# Patient Record
Sex: MALE | Race: WHITE
[De-identification: names, ages, dates, MRNs, and addresses within clinical notes are randomized per-mention and may not be internally consistent; named-entity substitution may affect disease eponyms.]

---

## 2021-07-15 ENCOUNTER — APPOINTMENT (OUTPATIENT)
Dept: CARDIOLOGY | Facility: CLINIC | Age: 36
End: 2021-07-15

## 2021-07-15 ENCOUNTER — INPATIENT (INPATIENT)
Facility: HOSPITAL | Age: 36
LOS: 0 days | Discharge: HOME | DRG: 103 | End: 2021-07-16
Attending: PSYCHIATRY & NEUROLOGY | Admitting: PSYCHIATRY & NEUROLOGY
Payer: COMMERCIAL

## 2021-07-15 ENCOUNTER — APPOINTMENT (OUTPATIENT)
Dept: NEUROLOGY | Facility: CLINIC | Age: 36
End: 2021-07-15
Payer: COMMERCIAL

## 2021-07-15 VITALS
SYSTOLIC BLOOD PRESSURE: 132 MMHG | HEART RATE: 72 BPM | DIASTOLIC BLOOD PRESSURE: 76 MMHG | RESPIRATION RATE: 20 BRPM | OXYGEN SATURATION: 98 % | HEIGHT: 71 IN | WEIGHT: 195.11 LBS | TEMPERATURE: 99 F

## 2021-07-15 VITALS
DIASTOLIC BLOOD PRESSURE: 75 MMHG | BODY MASS INDEX: 26.6 KG/M2 | SYSTOLIC BLOOD PRESSURE: 116 MMHG | WEIGHT: 190 LBS | HEIGHT: 71 IN | HEART RATE: 67 BPM

## 2021-07-15 DIAGNOSIS — R51.9 HEADACHE, UNSPECIFIED: ICD-10-CM

## 2021-07-15 DIAGNOSIS — R42 DIZZINESS AND GIDDINESS: ICD-10-CM

## 2021-07-15 DIAGNOSIS — G44.89 OTHER HEADACHE SYNDROME: ICD-10-CM

## 2021-07-15 DIAGNOSIS — Z98.89 OTHER SPECIFIED POSTPROCEDURAL STATES: Chronic | ICD-10-CM

## 2021-07-15 DIAGNOSIS — J45.909 UNSPECIFIED ASTHMA, UNCOMPLICATED: ICD-10-CM

## 2021-07-15 LAB
ALBUMIN SERPL ELPH-MCNC: 5 G/DL — SIGNIFICANT CHANGE UP (ref 3.3–5)
ALP SERPL-CCNC: 59 U/L — SIGNIFICANT CHANGE UP (ref 40–120)
ALT FLD-CCNC: 24 U/L — SIGNIFICANT CHANGE UP (ref 10–45)
ANION GAP SERPL CALC-SCNC: 15 MMOL/L — SIGNIFICANT CHANGE UP (ref 5–17)
APPEARANCE CSF: CLEAR — SIGNIFICANT CHANGE UP
APPEARANCE SPUN FLD: COLORLESS — SIGNIFICANT CHANGE UP
APTT BLD: 31 SEC — SIGNIFICANT CHANGE UP (ref 27.5–35.5)
AST SERPL-CCNC: 22 U/L — SIGNIFICANT CHANGE UP (ref 10–40)
BASOPHILS # BLD AUTO: 0.05 K/UL — SIGNIFICANT CHANGE UP (ref 0–0.2)
BASOPHILS NFR BLD AUTO: 0.7 % — SIGNIFICANT CHANGE UP (ref 0–2)
BILIRUB SERPL-MCNC: 0.3 MG/DL — SIGNIFICANT CHANGE UP (ref 0.2–1.2)
BUN SERPL-MCNC: 17 MG/DL — SIGNIFICANT CHANGE UP (ref 7–23)
CALCIUM SERPL-MCNC: 10.2 MG/DL — SIGNIFICANT CHANGE UP (ref 8.4–10.5)
CHLORIDE SERPL-SCNC: 101 MMOL/L — SIGNIFICANT CHANGE UP (ref 96–108)
CO2 SERPL-SCNC: 22 MMOL/L — SIGNIFICANT CHANGE UP (ref 22–31)
COLOR CSF: SIGNIFICANT CHANGE UP
CREAT SERPL-MCNC: 1.06 MG/DL — SIGNIFICANT CHANGE UP (ref 0.5–1.3)
EOSINOPHIL # BLD AUTO: 0.07 K/UL — SIGNIFICANT CHANGE UP (ref 0–0.5)
EOSINOPHIL NFR BLD AUTO: 1 % — SIGNIFICANT CHANGE UP (ref 0–6)
GLUCOSE CSF-MCNC: 59 MG/DL — SIGNIFICANT CHANGE UP (ref 40–70)
GLUCOSE SERPL-MCNC: 96 MG/DL — SIGNIFICANT CHANGE UP (ref 70–99)
HCT VFR BLD CALC: 41.3 % — SIGNIFICANT CHANGE UP (ref 39–50)
HGB BLD-MCNC: 14 G/DL — SIGNIFICANT CHANGE UP (ref 13–17)
IMM GRANULOCYTES NFR BLD AUTO: 0.3 % — SIGNIFICANT CHANGE UP (ref 0–1.5)
INR BLD: 0.94 RATIO — SIGNIFICANT CHANGE UP (ref 0.88–1.16)
LYMPHOCYTES # BLD AUTO: 2.22 K/UL — SIGNIFICANT CHANGE UP (ref 1–3.3)
LYMPHOCYTES # BLD AUTO: 32.7 % — SIGNIFICANT CHANGE UP (ref 13–44)
MCHC RBC-ENTMCNC: 30.8 PG — SIGNIFICANT CHANGE UP (ref 27–34)
MCHC RBC-ENTMCNC: 33.9 GM/DL — SIGNIFICANT CHANGE UP (ref 32–36)
MCV RBC AUTO: 91 FL — SIGNIFICANT CHANGE UP (ref 80–100)
MONOCYTES # BLD AUTO: 0.73 K/UL — SIGNIFICANT CHANGE UP (ref 0–0.9)
MONOCYTES NFR BLD AUTO: 10.8 % — SIGNIFICANT CHANGE UP (ref 2–14)
NEUTROPHILS # BLD AUTO: 3.69 K/UL — SIGNIFICANT CHANGE UP (ref 1.8–7.4)
NEUTROPHILS NFR BLD AUTO: 54.5 % — SIGNIFICANT CHANGE UP (ref 43–77)
NRBC # BLD: 0 /100 WBCS — SIGNIFICANT CHANGE UP (ref 0–0)
PLATELET # BLD AUTO: 237 K/UL — SIGNIFICANT CHANGE UP (ref 150–400)
POTASSIUM SERPL-MCNC: 3.9 MMOL/L — SIGNIFICANT CHANGE UP (ref 3.5–5.3)
POTASSIUM SERPL-SCNC: 3.9 MMOL/L — SIGNIFICANT CHANGE UP (ref 3.5–5.3)
PROT CSF-MCNC: 25 MG/DL — SIGNIFICANT CHANGE UP (ref 15–45)
PROT SERPL-MCNC: 7.8 G/DL — SIGNIFICANT CHANGE UP (ref 6–8.3)
PROTHROM AB SERPL-ACNC: 11.3 SEC — SIGNIFICANT CHANGE UP (ref 10.6–13.6)
RBC # BLD: 4.54 M/UL — SIGNIFICANT CHANGE UP (ref 4.2–5.8)
RBC # FLD: 12.9 % — SIGNIFICANT CHANGE UP (ref 10.3–14.5)
SODIUM SERPL-SCNC: 138 MMOL/L — SIGNIFICANT CHANGE UP (ref 135–145)
TROPONIN T, HIGH SENSITIVITY RESULT: <6 NG/L — SIGNIFICANT CHANGE UP (ref 0–51)
TUBE TYPE: SIGNIFICANT CHANGE UP
WBC # BLD: 6.78 K/UL — SIGNIFICANT CHANGE UP (ref 3.8–10.5)
WBC # FLD AUTO: 6.78 K/UL — SIGNIFICANT CHANGE UP (ref 3.8–10.5)

## 2021-07-15 PROCEDURE — 99285 EMERGENCY DEPT VISIT HI MDM: CPT

## 2021-07-15 PROCEDURE — 70496 CT ANGIOGRAPHY HEAD: CPT | Mod: 26,MG

## 2021-07-15 PROCEDURE — 99072 ADDL SUPL MATRL&STAF TM PHE: CPT

## 2021-07-15 PROCEDURE — 70498 CT ANGIOGRAPHY NECK: CPT | Mod: 26,MG

## 2021-07-15 PROCEDURE — 99221 1ST HOSP IP/OBS SF/LOW 40: CPT | Mod: GC

## 2021-07-15 PROCEDURE — G1004: CPT

## 2021-07-15 PROCEDURE — 93010 ELECTROCARDIOGRAM REPORT: CPT

## 2021-07-15 PROCEDURE — 99204 OFFICE O/P NEW MOD 45 MIN: CPT

## 2021-07-15 RX ORDER — ENOXAPARIN SODIUM 100 MG/ML
40 INJECTION SUBCUTANEOUS DAILY
Refills: 0 | Status: DISCONTINUED | OUTPATIENT
Start: 2021-07-15 | End: 2021-07-16

## 2021-07-15 RX ORDER — IBUPROFEN 200 MG
600 TABLET ORAL ONCE
Refills: 0 | Status: COMPLETED | OUTPATIENT
Start: 2021-07-15 | End: 2021-07-15

## 2021-07-15 RX ADMIN — Medication 600 MILLIGRAM(S): at 21:52

## 2021-07-15 RX ADMIN — Medication 600 MILLIGRAM(S): at 20:40

## 2021-07-15 NOTE — H&P ADULT - NSHPSOURCEINFORD_GEN_ALL_CORE
Chart(s)
Seen and examined,  was drinking and friend was concerned about alcohol intake. Chart reports that friend was concerned about self-harm statements.  Pt. denies this and denies SI/HI at time of exam. +intox at time of exam,  was drinking at 5p and also took Ambien PTA. No recent illness, no fever/chills, no sick contacts. MMM, clear lungs, heart reg, abd soft, NT to palp, no CVAT, no edema, NT calves.

## 2021-07-15 NOTE — ED PROVIDER NOTE - MUSCULOSKELETAL NEGATIVE STATEMENT, MLM
+BACK PAIN (per baseline), no other musculoskeletal pain, no neck pain, +R LEG WEAKNESS (PER BASELINE), no other weakness

## 2021-07-15 NOTE — H&P ADULT - ASSESSMENT
36M w/ PMH of Asthma, R. sided lumbar radiculopathy, L. knee disease, presents w/ headache and LOC event. Exam non focal. CTH/CTA negative.     Impression: Thunderclap headache, followed by bilateral tinnitus, and LOC event. r/o sentinel bleed. r/o seizures.     Plan:   [] LP in ED to rule out sentinel bleed  [] MR brain w/w/o contrast; MRV w/ contrast;   [] VEEG for 12 hours; Can discontinue and discontinue if negative  [] Anticipate early discharge     Case discussed w/ Dr. Rader

## 2021-07-15 NOTE — H&P ADULT - HISTORY OF PRESENT ILLNESS
36M w/ PMH of Asthma, R. sided lumbar radiculopathy, L. knee disease, presents w/ headache and LOC event.      Patient was at the bar on Tuesday July 13. Had 4 beers then started to feel "off". When he left the bar he developed at 12/10 headache that reached maximal onset within seconds. Directly after, he developed bilateral "ringing in his ears" and then had a LOC event for 10 seconds. When he was awoken he was having difficulty articulating himself for half an hour. He went to the ED at [facility] where he was told he had a prolonged QT interval, given mag and K. Discharged. Saw Dr. Kearney in clinic who sent him to the ED for further workup. No history of LOC episodes or seizures. No FH of seizures, aneurysms, strokes.     CTH negative  CTA h/n negative

## 2021-07-15 NOTE — ED PROVIDER NOTE - OBJECTIVE STATEMENT
36y M presenting with continued headache after an episode of syncope and headache 2 days ago, came to ED per referral from neurologist. 2 days ago patient was out drinking when he suddenly started to feel "off" and left the bar. He had 4 drinks over the course of 3.5 hours, normal for patient and denied feeling drunk at time. Upon leaving, patient had nausea, "worst headache of his life" ("12/10 pain"), ringing in both ears, and lost vision in both eyes. He tried to speak to tell his fiance to call 911 but speech came out garbled. Passed out but denies head trauma. LOC for 10 seconds. No shaking, incontinence or tongue biting but bit the inside of his lip. Remembers waking up on the sidewalk. He was then taken to the hospital (unsure if [facility] or [facility]) found prolonged QT, repleted Mg+ and K+ and sent him home. For the past two days he has had generalized headache (6/10), lethargy and lightheadedness. Has not taken any meds for pain. Went to neurologist Dr. Kearney today who sent him to ED for head imaging. Denies neck pain, visual changes, photophobia, focal neurologic deficits, paresthesias, numbness, or vomiting.

## 2021-07-15 NOTE — H&P ADULT - NSHPPHYSICALEXAM_GEN_ALL_CORE
Exam:   MS: Oriented x3. Fluent. Follows crossed commands.   CN: VFF. EOMI. V1-V3 intact. Face symmetric. T/u midline.   Motor: Full strength throughout.   Sensory: Intact to LT throughout   Reflexes: 2+ throughout. Babinski absent bilaterally.   Coordination: No dysmetria on FNF or ataxia on HTS bilaterally   Gait: Deferred

## 2021-07-15 NOTE — ASSESSMENT
[FreeTextEntry1] : Mr. Grijalva is a 36-year-old who suffers from asthma, right-sided lumbar radiculopathy and left knee meniscal disease.  After imbibing 5 alcoholic drinks 2 nights ago, he developed lightheadedness, nausea, severe headache, ear ringing, loss of balance, difficulty articulating and brief loss of consciousness.  He gradually improved but continues to experience headache and mental slowness.\par \par The cause of his presentation is unclear.  Alcohol intoxication may well have contributed to his presentation.  His symptoms seem very vasovagal in etiology.  He however developed severe global headache which has improved but not completely resolved.  He also briefly lost consciousness.  A sentinel hemorrhage or migraine are considerations.\par \par I suggested that he proceed to the emergency room for neurologic and cardiac assessments.  Further management will depend upon those results and his clinical course.

## 2021-07-15 NOTE — ED PROVIDER NOTE - NEURO NEGATIVE STATEMENT, MLM
no loss of consciousness, no gait abnormality, +HEADACHE, no sensory deficits, +R LEG WEAKNESS (PER BASELINE), no other weakness

## 2021-07-15 NOTE — ED ADULT NURSE NOTE - OBJECTIVE STATEMENT
37yo M, no known PMH. states  he had a syncopal episode 2 days ago, was out with friends, had a few drinks ( states this is not unusual for him ), began to feel ill, light headed, vision in and out, then states he passed out, fiance was there to help him up, unclear if pt hit his head directly. states since then he has not felt himself, still light headed and dizzy. was seen at another ED the day of incident and states he did not receive head CT. is aaox4, neuro exam grossly intact, PERRL. denies drug use, new supplements or change in activity. appears well. EKG and FS complete.

## 2021-07-15 NOTE — PHYSICAL EXAM
[FreeTextEntry1] : Constitutional:  Patient was well-developed, well-nourished and in no acute distress. \par \par Head:  Normocephalic, atraumatic. Tympanic membranes were clear. \par \par Neck:  Supple with full range of motion. \par \par Cardiovascular:  Cardiac rhythm was regular without murmur. There were no carotid bruits. Peripheral pulses were full and symmetric.  Pulse was 72 blood pressure was 140/90 in the right arm and 140/80 in the left.  \par \par Respiratory:  Lungs were clear. \par \par Abdomen:  Soft and nontender. \par \par Spine:  Nontender. \par \par Skin:  There were no rashes. \par \par NEUROLOGICAL EXAMINATION:\par \par Mental Status: Patient was alert and oriented. Speech was fluent. There was no dysarthria. \par \par Cranial Nerves: \par \par II: Visual acuity was 20/ 20 bilaterally with near card. Pupils were equal and reactive. Visual fields were full. Funduscopic examination was normal. \par \par III, IV, VI:  Eye movements were full without nystagmus. \par \par V: Facial sensation was intact. \par \par VII: Facial strength was normal. \par \par VIII: Hearing was equal. \par \par IX, X: Palatal movement was normal. Phonation was normal. \par \par XI: Sternocleidomastoids and trapezii were normal. \par \par XII: Tongue was midline and movements normal. There was no lingual atrophy or fasciculations. \par \par Motor Examination: Muscle bulk, tone and strength were normal. \par \par Sensory Examination: Pinprick, vibration and joint position sense were intact. \par \par Reflexes: DTRs were 1 at the biceps, 2 at the knees and ankles and absent elsewhere.\par \par Plantar Responses: Plantar responses were flexor. \par \par Coordination/Cerebellar Function: There was no dysmetria on finger to nose or heel to shin testing. \par \par Gait/Stance: Gait and tandem were normal. Romberg was negative.\par

## 2021-07-15 NOTE — CONSULT NOTE ADULT - SUBJECTIVE AND OBJECTIVE BOX
TRUONG MICHAUD  Male  MRN-[id number]    HPI:    36M w/ PMH of Asthma, R. sided lumbar radiculopathy, L. knee disease, presents w/ headache and LOC evidence.     Patient was at the bar on Tuesday July 13. Had 4 beers then started to feel "off". When he left the bar he developed at 12/10 headache that reached maximal onset within seconds. Directly after, he developed bilateral "ringing in his ears" and then had a LOC event for 10 seconds. When he was awoken he was having difficulty articulating himself for half an hour. He went to the ED at [facility] where he was told he had a prolonged QT interval, given mag and K. Discharged. Saw Dr. Kearney in clinic who sent him to the ED for further workup.     NIHSS:  MRS:     RENETTA: All negative except as mentioned in HPI    PAST MEDICAL & SURGICAL HISTORY:  Asthma    Seasonal allergies    History of arthroscopy of left knee  x 2 (2006 &amp; 2007)      MEDICATIONS  (STANDING):    MEDICATIONS  (PRN):    Allergies    eggs (Anaphylaxis)  No Known Drug Allergies  Tree Nuts (Other)    Intolerances        VITAL SIGNS:  T(F): 98.2  HR: 64  BP: 120/85  RR: 17  SpO2: 99%    Exam:   MS: Oriented x3. Fluent. Follows crossed commands.   CN: VFF. EOMI. V1-V3 intact. Face symmetric. T/u midline.   Motor: Full strength throughout.   Sensory: Intact to LT throughout   Reflexes: 2+ throughout. Babinski absent bilaterally.   Coordination: No dysmetria on FNF or ataxia on HTS bilaterally   Gait: Deferred    LABS:                          14.0   6.78  )-----------( 237      ( 15 Jul 2021 19:19 )             41.3     07-15    138  |  101  |  17  ----------------------------<  96  3.9   |  22  |  1.06    Ca    10.2      15 Jul 2021 19:19    TPro  7.8  /  Alb  5.0  /  TBili  0.3  /  DBili  x   /  AST  22  /  ALT  24  /  AlkPhos  59  07-15    PT/INR - ( 15 Jul 2021 19:19 )   PT: 11.3 sec;   INR: 0.94 ratio         PTT - ( 15 Jul 2021 19:19 )  PTT:31.0 sec    RADIOLOGY & ADDITIONAL STUDIES:             TRUONG MICHAUD  Male  MRN-[id number]    HPI:    36M w/ PMH of Asthma, R. sided lumbar radiculopathy, L. knee disease, presents w/ headache and LOC event.      Patient was at the bar on Tuesday July 13. Had 4 beers then started to feel "off". When he left the bar he developed at 12/10 headache that reached maximal onset within seconds. Directly after, he developed bilateral "ringing in his ears" and then had a LOC event for 10 seconds. When he was awoken he was having difficulty articulating himself for half an hour. He went to the ED at [facility] where he was told he had a prolonged QT interval, given mag and K. Discharged. Saw Dr. Kearney in clinic who sent him to the ED for further workup. No history of LOC episodes or seizures. No FH of seizures, aneurysms, strokes.     CTH negative  CTA h/n negative    NIHSS: 0  MRS: 0    ROS: All negative except as mentioned in HPI    PAST MEDICAL & SURGICAL HISTORY:  Asthma    Seasonal allergies    History of arthroscopy of left knee  x 2 (2006 &amp; 2007)      MEDICATIONS  (STANDING):    MEDICATIONS  (PRN):    Allergies    eggs (Anaphylaxis)  No Known Drug Allergies  Tree Nuts (Other)    Intolerances        VITAL SIGNS:  T(F): 98.2  HR: 64  BP: 120/85  RR: 17  SpO2: 99%    Exam:   MS: Oriented x3. Fluent. Follows crossed commands.   CN: VFF. EOMI. V1-V3 intact. Face symmetric. T/u midline.   Motor: Full strength throughout.   Sensory: Intact to LT throughout   Reflexes: 2+ throughout. Babinski absent bilaterally.   Coordination: No dysmetria on FNF or ataxia on HTS bilaterally   Gait: Deferred    LABS:                          14.0   6.78  )-----------( 237      ( 15 Jul 2021 19:19 )             41.3     07-15    138  |  101  |  17  ----------------------------<  96  3.9   |  22  |  1.06    Ca    10.2      15 Jul 2021 19:19    TPro  7.8  /  Alb  5.0  /  TBili  0.3  /  DBili  x   /  AST  22  /  ALT  24  /  AlkPhos  59  07-15    PT/INR - ( 15 Jul 2021 19:19 )   PT: 11.3 sec;   INR: 0.94 ratio         PTT - ( 15 Jul 2021 19:19 )  PTT:31.0 sec    RADIOLOGY & ADDITIONAL STUDIES:

## 2021-07-15 NOTE — ED PROVIDER NOTE - CLINICAL SUMMARY MEDICAL DECISION MAKING FREE TEXT BOX
ap- 36 M w/ hx of asthma, R sided lumbar radiculopathy, and L knee disease, presents to the ER w/ complaint of mental fogginess. Pt states that he left a bar on Tuesday on July 13, he developed a severe headache w/ associated nausea and syncope (unconcious for 10 seconds witnessed by his fiance) + slurred speech. Pt went to [facility] and was evaluated and was found to have prolonged qtc, and had mag and K repletion. Pt states that he has been having an improving headache, a 6/10 but reports lethargy, no recurrent episodes of syncope. Since the episode, pt has been tolerating po. Pt w/ no numbness no weakness (from his baseline), no vision changes. On exam, pt is awake and alert in no distress. Neuro: 5/5 bilateral elbow flexion/extension, 5/5 bilateral wrist flexion/extension, 5/5 bilateral hand , 5/5 bilateral hip flexion/extension, 5/5 bilateral knee flexion/extension, 5/5 bilateral dorsiflexion/plantarflexion, intact sensation in the bilateral arms and legs to light touch, normal finger to nose bilaterally w/ no dysmetria, EOMI, CN2-12 intact, pupils are 4 mm and reactive bilaterally  Plan for labs, ekg and ct of the head, pt was seen by Dr. Kearney today and recommended to come to the ER for further neurologic and cardiac eval ap- 36 M w/ hx of asthma, R sided lumbar radiculopathy, and L knee disease, presents to the ER w/ complaint of mental fogginess. Pt states that he left a bar on Tuesday on July 13, he developed a severe headache w/ associated nausea and syncope (unconscious for 10 seconds witnessed by his fiance) + slurred speech. Pt went to [facility] and was evaluated and was found to have prolonged qtc, and had mag and K repletion. Pt states that he has been having an improving headache, a 6/10 but reports lethargy, no recurrent episodes of syncope. Since the episode, pt has been tolerating po. Pt w/ no numbness no weakness (from his baseline), no vision changes. On exam, pt is awake and alert in no distress. Neuro: 5/5 bilateral elbow flexion/extension, 5/5 bilateral wrist flexion/extension, 5/5 bilateral hand , 5/5 bilateral hip flexion/extension, 5/5 bilateral knee flexion/extension, 5/5 bilateral dorsiflexion/plantarflexion, intact sensation in the bilateral arms and legs to light touch, normal finger to nose bilaterally w/ no dysmetria, EOMI, CN2-12 intact, pupils are 4 mm and reactive bilaterally  Plan for labs, ekg and ct of the head, pt was seen by Dr. Kearney today and recommended to come to the ER for further neurologic and cardiac eval

## 2021-07-15 NOTE — ED ADULT NURSE REASSESSMENT NOTE - NS ED NURSE REASSESS COMMENT FT1
Received report from MIGDALIA Henderson. Pt alert and oriented, denies chest pain, abdominal pain, nausea, vomiting, diarrhea. Chest rise equal and symmetrical, lung sounds clear. Pt c/o headache, Dr. Manrique made aware. Will continue to monitor.

## 2021-07-15 NOTE — ED PROVIDER NOTE - PROGRESS NOTE DETAILS
neuro recommendiong admission, pt w/ questions, neurology paged to assist w/ pt questions LP obtained neuro recommending admission, pt w/ questions, neurology paged to assist w/ pt questions LP obtained

## 2021-07-15 NOTE — CONSULT LETTER
[Dear  ___] : Dear  [unfilled], [Consult Letter:] : I had the pleasure of evaluating your patient, [unfilled]. [Please see my note below.] : Please see my note below. [Consult Closing:] : Thank you very much for allowing me to participate in the care of this patient.  If you have any questions, please do not hesitate to contact me. [Sincerely,] : Sincerely, [FreeTextEntry3] : Andry Kearney MD\par

## 2021-07-15 NOTE — REVIEW OF SYSTEMS
[Numbness] : numbness [Tingling] : tingling [Dizziness] : dizziness [Tension Headache] : tension-type headaches [Negative] : Heme/Lymph

## 2021-07-15 NOTE — H&P ADULT - ATTENDING COMMENTS
patient seen and examined at bedside. c/o lbp, no headache  exam non focal  CT head CTA normal  CSF unremarkable  MRI brain with kamari, MRV unremarkable  EEG results pending  OK to D/C home after EEG  outpt f/u with Dr Kearney

## 2021-07-15 NOTE — ED ADULT NURSE NOTE - NSIMPLEMENTINTERV_GEN_ALL_ED
Implemented All Universal Safety Interventions:  Effie to call system. Call bell, personal items and telephone within reach. Instruct patient to call for assistance. Room bathroom lighting operational. Non-slip footwear when patient is off stretcher. Physically safe environment: no spills, clutter or unnecessary equipment. Stretcher in lowest position, wheels locked, appropriate side rails in place.

## 2021-07-15 NOTE — HISTORY OF PRESENT ILLNESS
[FreeTextEntry1] : I had the pleasure of seeing Mr. Denzel Grijalva in the office today.  He is a 36 year right-handed patient who was referred for neurologic evaluation at your kind suggestion.\par \par Mr. 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  He has undergone 2 left knee meniscal repairs.\par \par On Tuesday evening July 13th, he drank 2 beers and had 3 mixed drinks with his fiancé and friends over 3 and half hours.  While standing at the bar, he suddenly felt bizarre.  He described feeling dizzy, lightheaded and nauseated.  He felt no better when he sat down.  He told his fiancée that he had to go home.  As they walked across the street, he developed a severe global headache, ear ringing and visual loss.  He was unable to speak or walk.  He got down on his knees and lost consciousness.  He was held by his fiancée and did not strike his head.  He believes that he regained consciousness within several seconds.  He sustained a bite to his right inner cheek.  He however was acutely short of breath and was wheezing.  He felt confused and had difficulty articulating.\par \par He was transported to the [facility] in [facility].  He underwent blood tests and an EKG.  No neuro imaging was performed.  He was told that his electrolytes were abnormal and he was administered potassium and magnesium.  He was discharged on Wednesday morning July 14 at 2 AM.\par \par He continues to experience mild lightheadedness and frontal headache.  He has chronic low back pain and right leg numbness.  He denies cognitive, visual, hearing, swallowing, motor, gait or sphincteric difficulties.  There is no history of migraines or headaches, seizures or strokes.

## 2021-07-15 NOTE — ED ADULT TRIAGE NOTE - CHIEF COMPLAINT QUOTE
PT was out on 7/13, felt like he was having a global headache, sycopized and had garbled speech.  PT presents today for evaluation of dizziness and headache x 2 days since syncopal episode.

## 2021-07-16 ENCOUNTER — TRANSCRIPTION ENCOUNTER (OUTPATIENT)
Age: 36
End: 2021-07-16

## 2021-07-16 VITALS
SYSTOLIC BLOOD PRESSURE: 121 MMHG | TEMPERATURE: 98 F | RESPIRATION RATE: 18 BRPM | DIASTOLIC BLOOD PRESSURE: 72 MMHG | OXYGEN SATURATION: 96 % | HEART RATE: 80 BPM

## 2021-07-16 LAB
GRAM STN FLD: SIGNIFICANT CHANGE UP
GRAM STN FLD: SIGNIFICANT CHANGE UP
NEUTROPHILS # CSF: SIGNIFICANT CHANGE UP (ref 0–6)
NRBC NFR CSF: <1 — SIGNIFICANT CHANGE UP (ref 0–5)
RBC # CSF: 3 /UL — HIGH (ref 0–0)
SARS-COV-2 RNA SPEC QL NAA+PROBE: SIGNIFICANT CHANGE UP
SPECIMEN SOURCE: SIGNIFICANT CHANGE UP

## 2021-07-16 PROCEDURE — 82962 GLUCOSE BLOOD TEST: CPT

## 2021-07-16 PROCEDURE — 84484 ASSAY OF TROPONIN QUANT: CPT

## 2021-07-16 PROCEDURE — 70498 CT ANGIOGRAPHY NECK: CPT

## 2021-07-16 PROCEDURE — 70553 MRI BRAIN STEM W/O & W/DYE: CPT | Mod: 26

## 2021-07-16 PROCEDURE — U0005: CPT

## 2021-07-16 PROCEDURE — U0003: CPT

## 2021-07-16 PROCEDURE — 87070 CULTURE OTHR SPECIMN AEROBIC: CPT

## 2021-07-16 PROCEDURE — 99239 HOSP IP/OBS DSCHRG MGMT >30: CPT

## 2021-07-16 PROCEDURE — A9585: CPT

## 2021-07-16 PROCEDURE — 95816 EEG AWAKE AND DROWSY: CPT | Mod: 26

## 2021-07-16 PROCEDURE — 70553 MRI BRAIN STEM W/O & W/DYE: CPT

## 2021-07-16 PROCEDURE — 87205 SMEAR GRAM STAIN: CPT

## 2021-07-16 PROCEDURE — 89051 BODY FLUID CELL COUNT: CPT

## 2021-07-16 PROCEDURE — 70545 MR ANGIOGRAPHY HEAD W/DYE: CPT | Mod: 26,59

## 2021-07-16 PROCEDURE — 80053 COMPREHEN METABOLIC PANEL: CPT

## 2021-07-16 PROCEDURE — 70450 CT HEAD/BRAIN W/O DYE: CPT

## 2021-07-16 PROCEDURE — 84157 ASSAY OF PROTEIN OTHER: CPT

## 2021-07-16 PROCEDURE — 95816 EEG AWAKE AND DROWSY: CPT

## 2021-07-16 PROCEDURE — 99285 EMERGENCY DEPT VISIT HI MDM: CPT | Mod: 25

## 2021-07-16 PROCEDURE — 85610 PROTHROMBIN TIME: CPT

## 2021-07-16 PROCEDURE — 82945 GLUCOSE OTHER FLUID: CPT

## 2021-07-16 PROCEDURE — 70545 MR ANGIOGRAPHY HEAD W/DYE: CPT

## 2021-07-16 PROCEDURE — 85730 THROMBOPLASTIN TIME PARTIAL: CPT

## 2021-07-16 PROCEDURE — 70496 CT ANGIOGRAPHY HEAD: CPT

## 2021-07-16 PROCEDURE — 62270 DX LMBR SPI PNXR: CPT

## 2021-07-16 PROCEDURE — 85025 COMPLETE CBC W/AUTO DIFF WBC: CPT

## 2021-07-16 RX ORDER — ACETAMINOPHEN 500 MG
650 TABLET ORAL EVERY 6 HOURS
Refills: 0 | Status: DISCONTINUED | OUTPATIENT
Start: 2021-07-16 | End: 2021-07-16

## 2021-07-16 RX ORDER — LANOLIN ALCOHOL/MO/W.PET/CERES
5 CREAM (GRAM) TOPICAL AT BEDTIME
Refills: 0 | Status: DISCONTINUED | OUTPATIENT
Start: 2021-07-16 | End: 2021-07-16

## 2021-07-16 RX ORDER — TRAMADOL HYDROCHLORIDE 50 MG/1
1 TABLET ORAL
Qty: 6 | Refills: 0
Start: 2021-07-16 | End: 2021-07-18

## 2021-07-16 RX ORDER — CYCLOBENZAPRINE HYDROCHLORIDE 10 MG/1
5 TABLET, FILM COATED ORAL ONCE
Refills: 0 | Status: COMPLETED | OUTPATIENT
Start: 2021-07-16 | End: 2021-07-16

## 2021-07-16 RX ORDER — ACETAMINOPHEN 500 MG
1000 TABLET ORAL ONCE
Refills: 0 | Status: COMPLETED | OUTPATIENT
Start: 2021-07-16 | End: 2021-07-16

## 2021-07-16 RX ADMIN — CYCLOBENZAPRINE HYDROCHLORIDE 5 MILLIGRAM(S): 10 TABLET, FILM COATED ORAL at 01:00

## 2021-07-16 RX ADMIN — Medication 5 MILLIGRAM(S): at 01:01

## 2021-07-16 RX ADMIN — Medication 1000 MILLIGRAM(S): at 08:30

## 2021-07-16 RX ADMIN — Medication 1000 MILLIGRAM(S): at 07:52

## 2021-07-16 NOTE — EEG REPORT - NS EEG TEXT BOX
REPORT OF ROUTINE EEG WITH VIDEO AND REPORT OF DIGITAL COMPRESSED SPECTRAL ARRAY ANALYSIS   [facility]: [address] [place] [address], Phone: [phone] [facility]: [phone], [place] [address], Phone: [phone] Office: [address] [facility], [address], [place] [address], Phone: [phone]  Patient Name: Denzel Grijalva    Age: 36 year : 1985 MRN #: [id number], Location: - Referring Physician: -  EEG #: [id number] Study Date: 2021   Start Time: 10:20:53 AM    Study Duration: 21.6 		  Technical Information:					 On Instrument: - Placement and Labeling of Electrodes: The EEG was performed utilizing 20 channels referential EEG connections (coronal over temporal over parasagittal montage) using all standard 10-20 electrode placements with EKG.  Recording was at a sampling rate of 256 samples per second per channel.  Time synchronized digital video recording was done simultaneously with EEG recording.  A low light infrared camera was used for low light recording.  Jagdeep and seizure detection algorithms were utilized.  History:  Routine EEG performed at bedside.  COR: Patient is Alert, Cooperastive , awake. Questions and answers correct x3  No Hv due to covid-19 protocol  No photiv due to equipment issues  Routine EEG for 36 year old Male patient with history of PMH of asthma, Right sided Lumbar Radiculopathy, L. Knee disease péw Headache and LOC event.   Medication No Data.  Study Interpretation:  FINDINGS:  The background was continuous, spontaneously variable and reactive.  During wakefulness, the posteriorly dominant rhythm consisted of symmetric, well modulated 9.5 Hz activity, with an amplitude to 30 uV, that attenuated to eye opening.  Low amplitude central beta was noted in wakefulness.  Background Slowing: Generalized slowing: none was present. Focal slowing: none was present.  Sleep Background: Stage II sleep transients were not recorded.  Epileptiform Activity:  No epileptiform discharges were present.  Events: No clinical events were recorded. No seizures were recorded.  Activation Procedures:  -Hyperventilation was not performed.   -Photic stimulation was not performed.  Artifacts: Intermittent myogenic and movement artifacts were noted.  ECG: The heart rate on single channel ECG was predominantly between 65-75 BPM.  EEG Classification / Summary: Normal Routine EEG Study in the awake state   Clinical Impression: Normal study. There were no epileptiform abnormalities recorded.    Preliminary Fellow Report, final report pending attending review  Elvis Arellano MD Epilepsy Fellow  Reading Room: [phone] On Call Service After Hours: [phone]    REPORT OF ROUTINE EEG WITH VIDEO AND REPORT OF DIGITAL COMPRESSED SPECTRAL ARRAY ANALYSIS   [facility]: [address], [address] [place] [address], Phone: [phone] [facility]: [phone], [place] [address], Phone: [phone] Office: [address] [facility], [address], [place] [address], Phone: [phone]  Patient Name: Denzel Grijalva    Age: 36 year : 1985 MRN #: [id number], Location: - Referring Physician: -  EEG #: [id number] Study Date: 2021   Start Time: 10:20:53 AM    Study Duration: 21.6 		  Technical Information:					 On Instrument: - Placement and Labeling of Electrodes: The EEG was performed utilizing 20 channels referential EEG connections (coronal over temporal over parasagittal montage) using all standard 10-20 electrode placements with EKG.  Recording was at a sampling rate of 256 samples per second per channel.  Time synchronized digital video recording was done simultaneously with EEG recording.  A low light infrared camera was used for low light recording.  Jagdeep and seizure detection algorithms were utilized.  History:  Routine EEG performed at bedside.  COR: Patient is Alert, Cooperastive , awake. Questions and answers correct x3  No Hv due to covid-19 protocol  No photiv due to equipment issues  Routine EEG for 36 year old Male patient with history of PMH of asthma, Right sided Lumbar Radiculopathy, L. Knee disease péw Headache and LOC event.   Medication No Data.  Study Interpretation:  FINDINGS:  The background was continuous, spontaneously variable and reactive.  During wakefulness, the posteriorly dominant rhythm consisted of symmetric, well modulated 9.5 Hz activity, with an amplitude to 30 uV, that attenuated to eye opening.  Low amplitude central beta was noted in wakefulness.  Background Slowing: Generalized slowing: none was present. Focal slowing: none was present.  Sleep Background: Stage II sleep transients were not recorded.  Epileptiform Activity:  No epileptiform discharges were present.  Events: No clinical events were recorded. No seizures were recorded.  Activation Procedures:  -Hyperventilation was not performed.   -Photic stimulation was not performed.  Artifacts: Intermittent myogenic and movement artifacts were noted.  ECG: The heart rate on single channel ECG was predominantly between 65-75 BPM.  EEG Classification / Summary: Normal Routine EEG Study in the awake state   Clinical Impression: Normal study. There were no epileptiform abnormalities recorded.    Elvis Arellano MD Epilepsy Fellow  Reading Room: [phone] On Call Service After Hours: [phone]
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
## 2021-07-16 NOTE — DISCHARGE NOTE PROVIDER - CARE PROVIDER_API CALL
Andry Kearney)  Electrodiagnostic Medicine; Internal Medicine; Neurology  [address] [facility] [address]  [place], [place] [id number]  Phone: [phone]  Fax: [phone]  Established Patient  Follow Up Time: 2 weeks

## 2021-07-16 NOTE — DISCHARGE NOTE NURSING/CASE MANAGEMENT/SOCIAL WORK - PATIENT PORTAL LINK FT
You can access the FollowMyHealth Patient Portal offered by [facility] by registering at the following website: [email or web address]. By joining [employer]’s FollowMyHealth portal, you will also be able to view your health information using other applications (apps) compatible with our system.

## 2021-07-16 NOTE — DISCHARGE NOTE PROVIDER - NSDCMRMEDTOKEN_GEN_ALL_CORE_FT
Claritin 10 mg oral tablet: 1 tab(s) orally once a day  Rectiv 0.4% rectal ointment: 1 application rectal every 12 hours   Claritin 10 mg oral tablet: 1 tab(s) orally once a day  Rectiv 0.4% rectal ointment: 1 application rectal every 12 hours  traMADol 50 mg oral tablet: 1 tab(s) orally 2 times a day, As Needed -for moderate pain MDD:100mg

## 2021-07-16 NOTE — DISCHARGE NOTE PROVIDER - NSDCCPCAREPLAN_GEN_ALL_CORE_FT
PRINCIPAL DISCHARGE DIAGNOSIS  Diagnosis: Other headache syndrome  Assessment and Plan of Treatment: You had a MRI which did not show a bleed and was unremarkable. Please follow-up with your neurologist for further management and care.       Good

## 2021-07-19 LAB
CULTURE RESULTS: SIGNIFICANT CHANGE UP
SPECIMEN SOURCE: SIGNIFICANT CHANGE UP

## 2021-08-03 ENCOUNTER — NON-APPOINTMENT (OUTPATIENT)
Age: 36
End: 2021-08-03

## 2021-08-03 ENCOUNTER — APPOINTMENT (OUTPATIENT)
Dept: CARDIOLOGY | Facility: CLINIC | Age: 36
End: 2021-08-03
Payer: COMMERCIAL

## 2021-08-03 VITALS — DIASTOLIC BLOOD PRESSURE: 76 MMHG | SYSTOLIC BLOOD PRESSURE: 123 MMHG

## 2021-08-03 VITALS
TEMPERATURE: 98.6 F | WEIGHT: 185 LBS | OXYGEN SATURATION: 100 % | HEIGHT: 71 IN | BODY MASS INDEX: 25.9 KG/M2 | HEART RATE: 71 BPM

## 2021-08-03 VITALS — DIASTOLIC BLOOD PRESSURE: 70 MMHG | SYSTOLIC BLOOD PRESSURE: 120 MMHG

## 2021-08-03 DIAGNOSIS — Z87.39 PERSONAL HISTORY OF OTHER DISEASES OF THE MUSCULOSKELETAL SYSTEM AND CONNECTIVE TISSUE: ICD-10-CM

## 2021-08-03 DIAGNOSIS — Z56.0 UNEMPLOYMENT, UNSPECIFIED: ICD-10-CM

## 2021-08-03 DIAGNOSIS — Z78.9 OTHER SPECIFIED HEALTH STATUS: ICD-10-CM

## 2021-08-03 DIAGNOSIS — Z83.438 FAMILY HISTORY OF OTHER DISORDER OF LIPOPROTEIN METABOLISM AND OTHER LIPIDEMIA: ICD-10-CM

## 2021-08-03 PROCEDURE — 93000 ELECTROCARDIOGRAM COMPLETE: CPT

## 2021-08-03 PROCEDURE — 99204 OFFICE O/P NEW MOD 45 MIN: CPT

## 2021-08-03 RX ORDER — MULTIVIT-MIN/FOLIC/VIT K/LYCOP 400-300MCG
50 MCG TABLET ORAL DAILY
Refills: 0 | Status: ACTIVE | COMMUNITY

## 2021-08-03 RX ORDER — IBUPROFEN 200 MG/1
200 TABLET, COATED ORAL DAILY
Refills: 0 | Status: ACTIVE | COMMUNITY
Start: 2021-08-03

## 2021-08-03 SDOH — ECONOMIC STABILITY - INCOME SECURITY: UNEMPLOYMENT, UNSPECIFIED: Z56.0

## 2021-08-27 ENCOUNTER — APPOINTMENT (OUTPATIENT)
Dept: CARDIOLOGY | Facility: CLINIC | Age: 36
End: 2021-08-27
Payer: COMMERCIAL

## 2021-08-27 PROCEDURE — 93306 TTE W/DOPPLER COMPLETE: CPT

## 2021-09-07 ENCOUNTER — NON-APPOINTMENT (OUTPATIENT)
Age: 36
End: 2021-09-07

## 2021-10-14 ENCOUNTER — APPOINTMENT (OUTPATIENT)
Dept: CARDIOLOGY | Facility: CLINIC | Age: 36
End: 2021-10-14
Payer: COMMERCIAL

## 2021-10-14 VITALS
HEART RATE: 91 BPM | BODY MASS INDEX: 26.6 KG/M2 | OXYGEN SATURATION: 97 % | SYSTOLIC BLOOD PRESSURE: 126 MMHG | HEIGHT: 71 IN | DIASTOLIC BLOOD PRESSURE: 81 MMHG | TEMPERATURE: 97.6 F | WEIGHT: 190 LBS

## 2021-10-14 DIAGNOSIS — R00.2 PALPITATIONS: ICD-10-CM

## 2021-10-14 DIAGNOSIS — R55 SYNCOPE AND COLLAPSE: ICD-10-CM

## 2021-10-14 PROCEDURE — 93242 EXT ECG>48HR<7D RECORDING: CPT

## 2021-10-14 PROCEDURE — 99214 OFFICE O/P EST MOD 30 MIN: CPT

## 2021-10-14 RX ORDER — CYCLOBENZAPRINE HYDROCHLORIDE 10 MG/1
10 TABLET, FILM COATED ORAL
Qty: 1 | Refills: 1 | Status: ACTIVE | COMMUNITY

## 2021-11-16 ENCOUNTER — APPOINTMENT (OUTPATIENT)
Dept: HUMAN REPRODUCTION | Facility: CLINIC | Age: 36
End: 2021-11-16

## 2021-12-10 ENCOUNTER — NON-APPOINTMENT (OUTPATIENT)
Age: 36
End: 2021-12-10

## 2023-01-26 ENCOUNTER — APPOINTMENT (OUTPATIENT)
Dept: UROLOGY | Facility: CLINIC | Age: 38
End: 2023-01-26
Payer: COMMERCIAL

## 2023-01-26 VITALS
SYSTOLIC BLOOD PRESSURE: 121 MMHG | RESPIRATION RATE: 16 BRPM | HEART RATE: 80 BPM | OXYGEN SATURATION: 97 % | TEMPERATURE: 97.8 F | DIASTOLIC BLOOD PRESSURE: 70 MMHG

## 2023-01-26 DIAGNOSIS — N50.82 SCROTAL PAIN: ICD-10-CM

## 2023-01-26 PROCEDURE — 99203 OFFICE O/P NEW LOW 30 MIN: CPT

## 2023-01-26 NOTE — PHYSICAL EXAM
[General Appearance - Well Developed] : well developed [General Appearance - Well Nourished] : well nourished [Normal Appearance] : normal appearance [Well Groomed] : well groomed [General Appearance - In No Acute Distress] : no acute distress [Edema] : no peripheral edema [Respiration, Rhythm And Depth] : normal respiratory rhythm and effort [Exaggerated Use Of Accessory Muscles For Inspiration] : no accessory muscle use [Abdomen Soft] : soft [Abdomen Tenderness] : non-tender [Costovertebral Angle Tenderness] : no ~M costovertebral angle tenderness [Urethral Meatus] : meatus normal [Urinary Bladder Findings] : the bladder was normal on palpation [Scrotum] : the scrotum was normal [Testes Mass (___cm)] : there were no testicular masses [Prostate Enlargement] : the prostate was not enlarged [Prostate Tenderness] : the prostate was not tender [No Prostate Nodules] : no prostate nodules [FreeTextEntry1] : PFMs wnl, no tenderness, no banding [Normal Station and Gait] : the gait and station were normal for the patient's age [] : no rash [No Focal Deficits] : no focal deficits [Oriented To Time, Place, And Person] : oriented to person, place, and time [Affect] : the affect was normal [Mood] : the mood was normal [Not Anxious] : not anxious [No Palpable Adenopathy] : no palpable adenopathy

## 2023-01-26 NOTE — REVIEW OF SYSTEMS
[Testicular Pain] : testicular pain [Urine retention] : urine retention [Wake up at night to urinate  How many times?  ___] : wakes up to urinate [unfilled] times during the night [Strong urge to urinate] : strong urge to urinate [Strain or push to urinate] : strain or push to urinate [Negative] : Heme/Lymph

## 2023-01-26 NOTE — HISTORY OF PRESENT ILLNESS
[None] : no symptoms [FreeTextEntry1] : 37 year old man seen 01/26/2023 with complaint of testicular pain. This began weeks to months ago.  \par It is moderate in severity. Ejaculation makes the symptoms better, nothing makes sx worse. \par It is associated with nothing. He reports bilateral varicocelectomy in the past. Has had recent semen analysis, which showed "good fertility."

## 2023-06-15 NOTE — ASSESSMENT
[FreeTextEntry1] : 36 yo M with scrotal pain. He is most concerned that this may be testis cancer, We discussed this is very unlikely mostly because testicular cancer is not painful on presentation. WIll send for scortal US.  Epidermal Closure: running subcuticular

## 2023-07-30 ENCOUNTER — EMERGENCY (EMERGENCY)
Facility: HOSPITAL | Age: 38
LOS: 0 days | Discharge: HOME | End: 2023-07-30
Attending: EMERGENCY MEDICINE
Payer: COMMERCIAL

## 2023-07-30 VITALS — HEIGHT: 68 IN | WEIGHT: 179.9 LBS

## 2023-07-30 VITALS
SYSTOLIC BLOOD PRESSURE: 144 MMHG | RESPIRATION RATE: 20 BRPM | TEMPERATURE: 98 F | OXYGEN SATURATION: 99 % | DIASTOLIC BLOOD PRESSURE: 91 MMHG | HEART RATE: 106 BPM

## 2023-07-30 DIAGNOSIS — S02.2XXA FRACTURE OF NASAL BONES, INITIAL ENCOUNTER FOR CLOSED FRACTURE: ICD-10-CM
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
## 2023-07-30 DIAGNOSIS — Z91.018 ALLERGY TO OTHER FOODS: ICD-10-CM

## 2023-07-30 DIAGNOSIS — Z98.89 OTHER SPECIFIED POSTPROCEDURAL STATES: Chronic | ICD-10-CM

## 2023-07-30 DIAGNOSIS — S01.81XA LACERATION WITHOUT FOREIGN BODY OF OTHER PART OF HEAD, INITIAL ENCOUNTER: ICD-10-CM

## 2023-07-30 DIAGNOSIS — Z91.012 ALLERGY TO EGGS: ICD-10-CM

## 2023-07-30 PROCEDURE — 76376 3D RENDER W/INTRP POSTPROCES: CPT

## 2023-07-30 PROCEDURE — 99284 EMERGENCY DEPT VISIT MOD MDM: CPT

## 2023-07-30 PROCEDURE — 99285 EMERGENCY DEPT VISIT HI MDM: CPT | Mod: 25

## 2023-07-30 PROCEDURE — 70450 CT HEAD/BRAIN W/O DYE: CPT | Mod: MA

## 2023-07-30 PROCEDURE — 70486 CT MAXILLOFACIAL W/O DYE: CPT | Mod: MA

## 2023-07-30 PROCEDURE — 70486 CT MAXILLOFACIAL W/O DYE: CPT | Mod: 26,MA

## 2023-07-30 PROCEDURE — 70450 CT HEAD/BRAIN W/O DYE: CPT | Mod: 26,MA

## 2023-07-30 PROCEDURE — 13131 CMPLX RPR F/C/C/M/N/AX/G/H/F: CPT

## 2023-07-30 PROCEDURE — 76376 3D RENDER W/INTRP POSTPROCES: CPT | Mod: 26

## 2023-07-30 RX ORDER — OXYCODONE AND ACETAMINOPHEN 5; 325 MG/1; MG/1
1 TABLET ORAL
Qty: 12 | Refills: 0
Start: 2023-07-30

## 2023-07-30 RX ORDER — IBUPROFEN 200 MG
600 TABLET ORAL ONCE
Refills: 0 | Status: COMPLETED | OUTPATIENT
Start: 2023-07-30 | End: 2023-07-30

## 2023-07-30 RX ADMIN — Medication 600 MILLIGRAM(S): at 16:16

## 2023-07-30 NOTE — ED STATDOCS - PATIENT PORTAL LINK FT
You can access the FollowMyHealth Patient Portal offered by [facility] by registering at the following website: [email or web address]. By joining [employer]’s FollowMyHealth portal, you will also be able to view your health information using other applications (apps) compatible with our system.
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
## 2023-07-30 NOTE — ED ADULT NURSE NOTE - NSFALLUNIVINTERV_ED_ALL_ED
Bed/Stretcher in lowest position, wheels locked, appropriate side rails in place/Call bell, personal items and telephone in reach/Instruct patient to call for assistance before getting out of bed/chair/stretcher/Non-slip footwear applied when patient is off stretcher/[place] to call system/Physically safe environment - no spills, clutter or unnecessary equipment/Purposeful proactive rounding/Room/bathroom lighting operational, light cord in reach
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
## 2023-07-30 NOTE — ED STATDOCS - ATTENDING APP SHARED VISIT CONTRIBUTION OF CARE
I,Kenny Mitchell MD,  performed the initial face to face bedside interview with this patient regarding history of present illness, review of symptoms and relevant past medical, social and family history.  I completed an independent physical examination.  I was the initial provider who evaluated this patient. I have signed out the follow up of any pending tests (i.e. labs, radiological studies) to the ACP.  I have communicated the patient’s plan of care and disposition with the ACP.  The history, relevant review of systems, past medical and surgical history, medical decision making, and physical examination was documented by the scribe in my presence and I attest to the accuracy of the documentation.
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
## 2023-07-30 NOTE — ED STATDOCS - CARE PROVIDER_API CALL
Ricardo Lopez  Plastic Surgery  [address] [place] [address], [address] [place] [id number]  Phone: [phone]  Fax: [phone]  Follow Up Time:

## 2023-07-30 NOTE — ED STATDOCS - PHYSICAL EXAMINATION
Gen: Awake, Alert, WD, WN, NAD  Head:  NC/AT  Eyes:  PERRL, EOMI, Conjunctiva pink, lids normal, no scleral icterus  ENT: OP clear, no exudates, no erythema, uvula midline, TMs clear bilaterally, moist mucus membranes  Neck: supple, nontender, no meningismus, no JVD, trachea midline  Cardiac/CV:  S1 S2, RRR, no M/G/R  Chest: nontender, no crepitus  Respiratory/Pulm:  CTAB, good air movement, normal resp effort, no wheezes/stridor/retractions/rales/rhonchi  Gastrointestinal/Abdomen:  Soft, nontender, nondistended, +BS, no rebound/guarding  Pelvis: stable, nontender, Hips: FROM, nontender  Back:  no CVAT, no MLT  Ext:  warm, well perfused, moving all extremities spontaneously, no cyanosis, no erythema, no edema, distal pulses intact  Skin: +2cm stellate laceration to the left cheek.  Neuro:  AAOx3, sensation intact, motor 5/5 x 4 extremities, normal gait, speech clear

## 2023-07-30 NOTE — ED STATDOCS - NSFOLLOWUPINSTRUCTIONS_ED_ALL_ED_FT
Nasal Fracture  A nasal fracture is a break or crack in the bones of the nose or the tissue that helps to form the nose (cartilage). Minor breaks do not require treatment and usually heal on their own after about one month. Serious breaks may require treatment that could include surgery.    What are the causes?  A nasal fracture is usually caused by the strong force of a direct hit to the nose (blunt injury). This type of injury often occurs from:  Playing a contact sport.  Being involved in a car accident.  Falling.  Getting punched in the face.  What are the signs or symptoms?  Symptoms of this condition include:  Pain.  Swelling of the nose.  Bleeding from the nose.  Bruising around the nose or bruising around the eyes (black eyes).  Crooked appearance of the nose.  How is this diagnosed?  This condition may be diagnosed based on a physical exam. During the exam, the health care provider will:  Gently feel the nose for signs of broken bones.  Look inside the nostrils to check if there is a blood-filled swelling on the dividing wall between the nostrils (septal hematoma).  An X-ray of the nose may be taken. Sometimes, an X-ray may not show a nasal fracture even when one is present. In some cases, X-rays or a CT scan may be taken again 1–5 days later after the swelling has gone down.    How is this treated?  Treatment for this condition depends on the severity of the injury.  Minor fractures that have not caused deformity often do not require treatment. They may heal on their own.  For more serious fractures that have caused bones to move out of position, treatment may involve one of the following:  Repositioning the bones without surgery. The health care provider may be able to do this in his or her office after you are given medicine to numb the nasal area (local anesthetic).  Surgery. If surgery is needed, it will be done after the swelling is gone. Surgery will stabilize and align the fracture.  Follow these instructions at home:  Bag of ice on a towel on the skin.   The correct and incorrect way to hold your head and fingers for first aid during a nosebleed.  Activity    Return to your normal activities as told by your health care provider. Ask your health care provider what activities are safe for you.  Do not play contact sports for 3–4 weeks or as told by your health care provider.  Managing pain and swelling    If directed, put ice on the injured area. To do this:  Put ice in a plastic bag.  Place a towel between your skin and the bag.  Leave the ice on for 20 minutes, 2–3 times a day.  Take off the ice if your skin turns bright red. This is very important. If you cannot feel pain, heat, or cold, you have a greater risk of damage to the area.  General instructions    Take over-the-counter and prescription medicines only as told by your health care provider.  If your nose starts to bleed, sit in an upright position while you squeeze the soft parts of your nose against the dividing wall between your nostrils (septum) for 10 minutes.  Try to avoid blowing your nose.  Keep all follow-up visits. This is important.  Contact a health care provider if:  Your pain increases or becomes severe.  You continue to have nosebleeds.  The shape of your nose does not return to normal within 5 days.  You have pus draining out of your nose.  Get help right away if:  You have bleeding from your nose that does not stop after you pinch your nostrils closed for 20 minutes and keep ice on your nose.  You have clear fluid draining out of your nose.  You notice swelling near the septum inside the nose. This swelling is a septal hematoma that must be drained to help prevent infection.  You have difficulty moving your eyes.  You have repeated vomiting.  These symptoms may be an emergency. Get help right away. Call 911.  Do not wait to see if the symptoms will go away.  Do not drive yourself to the hospital.  Summary  A nasal fracture is a break or crack in the bones or cartilage of the nose.  The fracture is usually caused by a blunt injury to the nose.  Symptoms include pain, swelling, and facial bruising.  Nasal fractures may heal on their own, or your health care provider may need to move the bones back into proper position. In some cases, surgery may be needed.  This information is not intended to replace advice given to you by your health care provider. Make sure you discuss any questions you have with your health care provider.    Laceration    WHAT YOU NEED TO KNOW:    A laceration is an injury to the skin and the soft tissue underneath it. Lacerations happen when you are cut or hit by something. They can happen anywhere on the body.     DISCHARGE INSTRUCTIONS:    Return to the emergency department if:     You have heavy bleeding or bleeding that does not stop after 10 minutes of holding firm, direct pressure over the wound.       Your wound opens up.     Contact your healthcare provider if:     You have a fever or chills.       Your laceration is red, warm, or swollen.      You have red streaks on your skin coming from your wound.      You have white or yellow drainage from the wound that smells bad.      You have pain that gets worse, even after treatment.       You have questions or concerns about your condition or care.     Medicines:     Prescription pain medicine may be given. Ask how to take this medicine safely.       Antibiotics help treat or prevent a bacterial infection.       Take your medicine as directed. Contact your healthcare provider if you think your medicine is not helping or if you have side effects. Tell him or her if you are allergic to any medicine. Keep a list of the medicines, vitamins, and herbs you take. Include the amounts, and when and why you take them. Bring the list or the pill bottles to follow-up visits. Carry your medicine list with you in case of an emergency.    Care for your wound as directed:     Do not get your wound wet until your healthcare provider says it is okay. Do not soak your wound in water. Do not go swimming until your healthcare provider says it is okay. Carefully wash the wound with soap and water. Gently pat the area dry or allow it to air dry.       Change your bandages when they get wet, dirty, or after washing. Apply new, clean bandages as directed. Do not apply elastic bandages or tape too tight. Do not put powders or lotions over your incision.       Apply antibiotic ointment as directed. Your healthcare provider may give you antibiotic ointment to put over your wound if you have stitches. If you have strips of tape over your incision, let them dry up and fall off on their own. If they do not fall off within 14 days, gently remove them. If you have glue over your wound, do not remove or pick at it. If your glue comes off, do not replace it with glue that you have at home.       Check your wound every day for signs of infection such as swelling, redness, or pus.     Self-care:     Apply ice on your wound for 15 to 20 minutes every hour or as directed. Use an ice pack, or put crushed ice in a plastic bag. Cover it with a towel. Ice helps prevent tissue damage and decreases swelling and pain.      Use a splint as directed. A splint will decrease movement and stress on your wound. It may help it heal faster. A splint may be used for lacerations over joints or areas of your body that bend. Ask your healthcare provider how to apply and remove a splint.       Decrease scarring of your wound by applying ointments as directed. Do not apply ointments until your healthcare provider says it is okay. You may need to wait until your wound is healed. Ask which ointment to buy and how often to use it. After your wound is healed, use sunscreen over the area when you are out in the sun. You should do this for at least 6 months to 1 year after your injury.     Follow up with your healthcare provider as directed: You may need to follow up in 24 to 48 hours to have your wound checked for infection. You will need to return in 3 to 14 days if you have stitches or staples so they can be removed. Care for your wound as directed to prevent infection and help it heal. Write down your questions so you remember to ask them during your visits.
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
